# Patient Record
Sex: FEMALE | Race: WHITE | NOT HISPANIC OR LATINO | Employment: FULL TIME | URBAN - METROPOLITAN AREA
[De-identification: names, ages, dates, MRNs, and addresses within clinical notes are randomized per-mention and may not be internally consistent; named-entity substitution may affect disease eponyms.]

---

## 2023-03-03 LAB
ALANINE AMINOTRANSFERASE (SGPT) (U/L) IN SER/PLAS: 18 U/L (ref 7–45)
ALBUMIN (G/DL) IN SER/PLAS: 4.3 G/DL (ref 3.4–5)
ALKALINE PHOSPHATASE (U/L) IN SER/PLAS: 62 U/L (ref 33–110)
ANION GAP IN SER/PLAS: 12 MMOL/L (ref 10–20)
ASPARTATE AMINOTRANSFERASE (SGOT) (U/L) IN SER/PLAS: 16 U/L (ref 9–39)
BILIRUBIN TOTAL (MG/DL) IN SER/PLAS: 0.8 MG/DL (ref 0–1.2)
CALCIUM (MG/DL) IN SER/PLAS: 9.2 MG/DL (ref 8.6–10.6)
CARBON DIOXIDE, TOTAL (MMOL/L) IN SER/PLAS: 28 MMOL/L (ref 21–32)
CHLORIDE (MMOL/L) IN SER/PLAS: 107 MMOL/L (ref 98–107)
CHOLESTEROL (MG/DL) IN SER/PLAS: 154 MG/DL (ref 0–199)
CHOLESTEROL IN HDL (MG/DL) IN SER/PLAS: 52.8 MG/DL
CHOLESTEROL/HDL RATIO: 2.9
CREATININE (MG/DL) IN SER/PLAS: 0.57 MG/DL (ref 0.5–1.05)
ERYTHROCYTE DISTRIBUTION WIDTH (RATIO) BY AUTOMATED COUNT: 12.5 % (ref 11.5–14.5)
ERYTHROCYTE MEAN CORPUSCULAR HEMOGLOBIN CONCENTRATION (G/DL) BY AUTOMATED: 32.4 G/DL (ref 32–36)
ERYTHROCYTE MEAN CORPUSCULAR VOLUME (FL) BY AUTOMATED COUNT: 92 FL (ref 80–100)
ERYTHROCYTES (10*6/UL) IN BLOOD BY AUTOMATED COUNT: 4.86 X10E12/L (ref 4–5.2)
GFR FEMALE: >90 ML/MIN/1.73M2
GLUCOSE (MG/DL) IN SER/PLAS: 83 MG/DL (ref 74–99)
HEMATOCRIT (%) IN BLOOD BY AUTOMATED COUNT: 44.8 % (ref 36–46)
HEMOGLOBIN (G/DL) IN BLOOD: 14.5 G/DL (ref 12–16)
LDL: 91 MG/DL (ref 0–99)
LEUKOCYTES (10*3/UL) IN BLOOD BY AUTOMATED COUNT: 6.5 X10E9/L (ref 4.4–11.3)
NRBC (PER 100 WBCS) BY AUTOMATED COUNT: 0 /100 WBC (ref 0–0)
PLATELETS (10*3/UL) IN BLOOD AUTOMATED COUNT: 320 X10E9/L (ref 150–450)
POTASSIUM (MMOL/L) IN SER/PLAS: 4 MMOL/L (ref 3.5–5.3)
PROTEIN TOTAL: 6.3 G/DL (ref 6.4–8.2)
SODIUM (MMOL/L) IN SER/PLAS: 143 MMOL/L (ref 136–145)
THYROTROPIN (MIU/L) IN SER/PLAS BY DETECTION LIMIT <= 0.05 MIU/L: 0.32 MIU/L (ref 0.44–3.98)
THYROXINE (T4) FREE (NG/DL) IN SER/PLAS: 0.96 NG/DL (ref 0.78–1.48)
TRIGLYCERIDE (MG/DL) IN SER/PLAS: 50 MG/DL (ref 0–149)
UREA NITROGEN (MG/DL) IN SER/PLAS: 17 MG/DL (ref 6–23)
VLDL: 10 MG/DL (ref 0–40)

## 2023-04-18 DIAGNOSIS — F41.9 ANXIETY: Primary | ICD-10-CM

## 2023-04-19 RX ORDER — ESCITALOPRAM OXALATE 20 MG/1
TABLET ORAL
Qty: 90 TABLET | Refills: 1 | Status: SHIPPED | OUTPATIENT
Start: 2023-04-19 | End: 2024-01-28

## 2023-09-07 ENCOUNTER — APPOINTMENT (OUTPATIENT)
Dept: PRIMARY CARE | Facility: CLINIC | Age: 43
End: 2023-09-07
Payer: COMMERCIAL

## 2023-09-12 DIAGNOSIS — I10 PRIMARY HYPERTENSION: ICD-10-CM

## 2023-09-15 RX ORDER — HYDROCHLOROTHIAZIDE 25 MG/1
25 TABLET ORAL DAILY
Qty: 90 TABLET | Refills: 1 | Status: SHIPPED | OUTPATIENT
Start: 2023-09-15

## 2023-12-05 DIAGNOSIS — I10 PRIMARY HYPERTENSION: ICD-10-CM

## 2023-12-06 RX ORDER — LOSARTAN POTASSIUM 100 MG/1
100 TABLET ORAL DAILY
Qty: 90 TABLET | Refills: 0 | Status: SHIPPED | OUTPATIENT
Start: 2023-12-06 | End: 2024-04-03

## 2023-12-06 RX ORDER — BUPROPION HYDROCHLORIDE 300 MG/1
300 TABLET ORAL DAILY
Qty: 90 TABLET | Refills: 0 | Status: SHIPPED | OUTPATIENT
Start: 2023-12-06 | End: 2024-02-23 | Stop reason: SDUPTHER

## 2024-01-08 ENCOUNTER — TELEPHONE (OUTPATIENT)
Dept: OBSTETRICS AND GYNECOLOGY | Facility: CLINIC | Age: 44
End: 2024-01-08
Payer: COMMERCIAL

## 2024-01-08 NOTE — TELEPHONE ENCOUNTER
Left message for Lisa Cabezas to give the office a call back to r/s her appointment for 2/22/24.     VENITA CUMMINGS MA

## 2024-01-24 ENCOUNTER — OFFICE VISIT (OUTPATIENT)
Dept: PRIMARY CARE | Facility: CLINIC | Age: 44
End: 2024-01-24
Payer: COMMERCIAL

## 2024-01-24 ENCOUNTER — APPOINTMENT (OUTPATIENT)
Dept: RADIOLOGY | Facility: CLINIC | Age: 44
End: 2024-01-24
Payer: COMMERCIAL

## 2024-01-24 VITALS
BODY MASS INDEX: 34.9 KG/M2 | DIASTOLIC BLOOD PRESSURE: 88 MMHG | WEIGHT: 197 LBS | SYSTOLIC BLOOD PRESSURE: 148 MMHG | HEART RATE: 72 BPM

## 2024-01-24 DIAGNOSIS — F32.A DEPRESSIVE DISORDER: ICD-10-CM

## 2024-01-24 DIAGNOSIS — S60.519S: ICD-10-CM

## 2024-01-24 DIAGNOSIS — I10 HYPERTENSION, UNSPECIFIED TYPE: ICD-10-CM

## 2024-01-24 DIAGNOSIS — J45.20 MILD INTERMITTENT ASTHMA, UNSPECIFIED WHETHER COMPLICATED (HHS-HCC): Primary | ICD-10-CM

## 2024-01-24 PROCEDURE — 99214 OFFICE O/P EST MOD 30 MIN: CPT | Performed by: INTERNAL MEDICINE

## 2024-01-24 PROCEDURE — 3079F DIAST BP 80-89 MM HG: CPT | Performed by: INTERNAL MEDICINE

## 2024-01-24 PROCEDURE — 3077F SYST BP >= 140 MM HG: CPT | Performed by: INTERNAL MEDICINE

## 2024-01-24 PROCEDURE — 1036F TOBACCO NON-USER: CPT | Performed by: INTERNAL MEDICINE

## 2024-01-24 RX ORDER — CLONIDINE 0.2 MG/24H
PATCH, EXTENDED RELEASE TRANSDERMAL
Qty: 4 PATCH | Refills: 0 | Status: SHIPPED | OUTPATIENT
Start: 2024-01-24 | End: 2024-02-23 | Stop reason: WASHOUT

## 2024-01-24 NOTE — PROGRESS NOTES
Subjective   Patient ID: Lisa Cabezas is a 43 y.o. female who presents for Follow-up.    HPI     Patient is a 43-year-old female with past medical history of anxiety and hypertension who presents for follow-up.  Has not been seen since March 2023.  Her blood pressure today is not well-controlled.  She does report significant anxiety related to being a single parent.  She states that her child's father is in the picture and is very supportive.  Nevertheless the stresses are quite difficult.  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  She is taking her medications as prescribed.  He has gained 7 pounds since last year.    She states that while her anxiety goes up and down she feels like it is as controlled as it can be.  She feels like her anxiety is due to the stress of work and the stresses of being a single mom.  She is happy with the medication as it is    She is concerned about arthritis of the hands.  It has been progressive over the last year.  Affecting both hands equally.  No swelling of the joints        Review of Systems  Constitutional: No fever or chills  Cardiovascular: no chest pain, no palpitations and no syncope.   Respiratory: no cough, no shortness of breath during exertion and no shortness of breath at rest.   Gastrointestinal: no abdominal pain, no nausea and no vomiting.  Neuro: No Headache, no dizziness    Objective   /88   Pulse 72   Wt 89.4 kg (197 lb)   BMI 34.90 kg/m²     Physical Exam  Constitutional: Alert and in no acute distress. Well developed, well nourished  Head and Face: Head and face: Normal.    Cardiovascular: Heart rate and rhythm were normal, normal S1 and S2. No peripheral edema.   Pulmonary: No respiratory distress. Clear bilateral breath sounds.  Musculoskeletal: Gait and station: Normal. Muscle strength/tone: Normal.   Skin: Normal skin color and pigmentation, normal skin turgor,  and no rash.    Psychiatric: Judgment and insight: Intact. Mood and affect: Normal.        Lab Results   Component Value Date    WBC 6.5 03/03/2023    HGB 14.5 03/03/2023    HCT 44.8 03/03/2023     03/03/2023    CHOL 154 03/03/2023    TRIG 50 03/03/2023    HDL 52.8 03/03/2023    ALT 18 03/03/2023    AST 16 03/03/2023     03/03/2023    K 4.0 03/03/2023     03/03/2023    CREATININE 0.57 03/03/2023    BUN 17 03/03/2023    CO2 28 03/03/2023    TSH 0.32 (L) 03/03/2023       US limited breast  Narrative: Interpreted By:  HAKAN HEREDIA MD  MRN: [id number]  Patient Name: NATHANIEL PICKENS     STUDY:  BREAST ULTRASOUND; DIGITAL DIAG MAMM LEFT W/KARINE UNILAT;  4/10/2023  2:31 pm; 4/10/2023 1:51 pm     ACCESSION NUMBER(S):  [id number]; [id number]     ORDERING CLINICIAN:  TERRELL VORA     INDICATION:  Recall from screening exam for left breast asymmetry.     COMPARISON:  03/07/2023, 11/03/2021, 11/04/2020     FINDINGS:  MAMMOGRAPHY: 2D and tomosynthesis images were reviewed at 1 mm slice  thickness.     The breast tissue is heterogeneously dense, which may obscure small  masses.  Asymmetry in the superior left breast persists on additional  spot compression view but contains interspersed fat and may represent  focal fibroglandular tissue.     ULTRASOUND: Targeted ultrasound was performed of the superior left  breast by a registered sonographer with elastography. In the superior  left breast at 12:30, 9 cm from the nipple, there is a focal area of  fibroglandular tissue which corresponds to the area seen  mammographically. No other focal sonographic abnormalities visualized  in the superior left breast. The tissue is soft on elastography.     Impression: Previously seen left breast asymmetry corresponds to benign focal  dense fibroglandular tissue. No mammographic or targeted sonographic  evidence of malignancy in the left breast. Patient may resume annual  screening.     BI-RADS CATEGORY:     Category: 1 -  Negative.  Recommendation: 1 Year Screening.     For any future breast imaging appointments, please call [phone]  ([phone]).     Patient letter sent SNORM     BI digital DIAG mamm  Narrative: Interpreted By:  HAKAN HEREDIA MD  MRN: [id number]  Patient Name: NATHANIEL PICKENS     STUDY:  BREAST ULTRASOUND; DIGITAL DIAG MAMM LEFT W/KARINE UNILAT;  4/10/2023  2:31 pm; 4/10/2023 1:51 pm     ACCESSION NUMBER(S):  [id number]; [id number]     ORDERING CLINICIAN:  TERRELL VORA     INDICATION:  Recall from screening exam for left breast asymmetry.     COMPARISON:  03/07/2023, 11/03/2021, 11/04/2020     FINDINGS:  MAMMOGRAPHY: 2D and tomosynthesis images were reviewed at 1 mm slice  thickness.     The breast tissue is heterogeneously dense, which may obscure small  masses.  Asymmetry in the superior left breast persists on additional  spot compression view but contains interspersed fat and may represent  focal fibroglandular tissue.     ULTRASOUND: Targeted ultrasound was performed of the superior left  breast by a registered sonographer with elastography. In the superior  left breast at 12:30, 9 cm from the nipple, there is a focal area of  fibroglandular tissue which corresponds to the area seen  mammographically. No other focal sonographic abnormalities visualized  in the superior left breast. The tissue is soft on elastography.     Impression: Previously seen left breast asymmetry corresponds to benign focal  dense fibroglandular tissue. No mammographic or targeted sonographic  evidence of malignancy in the left breast. Patient may resume annual  screening.     BI-RADS CATEGORY:     Category: 1 - Negative.  Recommendation: 1 Year Screening.     For any future breast imaging appointments, please call [phone]  [phone]).     Patient letter sent SNORM               Assessment/Plan   Problem List Items Addressed This Visit             ICD-10-CM    Mild intermittent asthma, unspecified whether complicated - Primary J45.20     Stable  with no recent flares         Depressive disorder F32.A     Continue bupropion and escitalopram as prescribed.         Hypertension I10     Uncontrolled.  Can considering anxiety will start clonidine patch to help bring down the blood pressure.  Return to clinic 1 month for reevaluation.  Can consider changing medications if it is not helpful.         Relevant Medications    cloNIDine (Catapres-TTS-2) 0.2 mg/24 hr patch    Other Relevant Orders    Follow Up [facility] - PCP - Established     Other Visit Diagnoses         Codes    Abrasion of hand, unspecified laterality, sequela     S60.519S    Relevant Orders    XR hand left 3+ views

## 2024-01-24 NOTE — ASSESSMENT & PLAN NOTE
Uncontrolled.  Can considering anxiety will start clonidine patch to help bring down the blood pressure.  Return to clinic 1 month for reevaluation.  Can consider changing medications if it is not helpful.

## 2024-01-27 DIAGNOSIS — F41.9 ANXIETY: ICD-10-CM

## 2024-01-28 RX ORDER — ESCITALOPRAM OXALATE 20 MG/1
20 TABLET ORAL DAILY
Qty: 90 TABLET | Refills: 0 | Status: SHIPPED | OUTPATIENT
Start: 2024-01-28 | End: 2024-05-20

## 2024-02-02 ENCOUNTER — HOSPITAL ENCOUNTER (OUTPATIENT)
Dept: RADIOLOGY | Facility: CLINIC | Age: 44
Discharge: HOME | End: 2024-02-02
Payer: COMMERCIAL

## 2024-02-02 DIAGNOSIS — S60.519S: ICD-10-CM

## 2024-02-02 PROCEDURE — 73130 X-RAY EXAM OF HAND: CPT | Mod: LT

## 2024-02-02 PROCEDURE — 73130 X-RAY EXAM OF HAND: CPT | Mod: LEFT SIDE | Performed by: RADIOLOGY

## 2024-02-19 ENCOUNTER — APPOINTMENT (OUTPATIENT)
Dept: PRIMARY CARE | Facility: CLINIC | Age: 44
End: 2024-02-19
Payer: COMMERCIAL

## 2024-02-23 ENCOUNTER — OFFICE VISIT (OUTPATIENT)
Dept: PRIMARY CARE | Facility: CLINIC | Age: 44
End: 2024-02-23
Payer: COMMERCIAL

## 2024-02-23 VITALS
WEIGHT: 195 LBS | BODY MASS INDEX: 34.54 KG/M2 | DIASTOLIC BLOOD PRESSURE: 84 MMHG | OXYGEN SATURATION: 99 % | SYSTOLIC BLOOD PRESSURE: 141 MMHG | HEART RATE: 74 BPM

## 2024-02-23 DIAGNOSIS — F32.A DEPRESSIVE DISORDER: Primary | ICD-10-CM

## 2024-02-23 DIAGNOSIS — I10 HYPERTENSION, UNSPECIFIED TYPE: ICD-10-CM

## 2024-02-23 DIAGNOSIS — I10 PRIMARY HYPERTENSION: ICD-10-CM

## 2024-02-23 PROCEDURE — 99213 OFFICE O/P EST LOW 20 MIN: CPT | Performed by: INTERNAL MEDICINE

## 2024-02-23 PROCEDURE — 1036F TOBACCO NON-USER: CPT | Performed by: INTERNAL MEDICINE

## 2024-02-23 PROCEDURE — 3077F SYST BP >= 140 MM HG: CPT | Performed by: INTERNAL MEDICINE

## 2024-02-23 PROCEDURE — 3079F DIAST BP 80-89 MM HG: CPT | Performed by: INTERNAL MEDICINE

## 2024-02-23 RX ORDER — BUPROPION HYDROCHLORIDE 450 MG/1
450 TABLET, FILM COATED, EXTENDED RELEASE ORAL DAILY
Qty: 90 TABLET | Refills: 1 | Status: SHIPPED | OUTPATIENT
Start: 2024-02-23

## 2024-02-23 RX ORDER — CLONIDINE 0.1 MG/24H
PATCH, EXTENDED RELEASE TRANSDERMAL
Qty: 4 PATCH | Refills: 2 | Status: SHIPPED | OUTPATIENT
Start: 2024-02-23 | End: 2024-06-11 | Stop reason: SDUPTHER

## 2024-02-23 NOTE — PROGRESS NOTES
Subjective   Patient ID: Lisa Cabezas is a 43 y.o. female who presents for No chief complaint on file..    HPI     Patient is a 43-year-old female with past medical history of anxiety and hypertension who presents for follow-up.  HShe does report significant anxiety related to being a single parent.  She states that her child's father is in the picture and is very supportive.  Nevertheless the stresses are quite difficult.  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  She is taking her medications as prescribed.  Last visit clonidine was added at 0.2 formulation of the patch.  She states that she feels much better on the medication however it causes constipation.  Blood pressure is improved but not at goal    She states that while her anxiety goes up and down she feels like it is as controlled as it can be.  She feels like her anxiety is due to the stress of work and the stresses of being a single mom.              Review of Systems  Constitutional: No fever or chills  Cardiovascular: no chest pain, no palpitations and no syncope.   Respiratory: no cough, no shortness of breath during exertion and no shortness of breath at rest.   Gastrointestinal: no abdominal pain, no nausea and no vomiting.  Neuro: No Headache, no dizziness    Objective   /84   Pulse 74   Wt 88.5 kg (195 lb)   SpO2 99%   BMI 34.54 kg/m²     Physical Exam  Constitutional: Alert and in no acute distress. Well developed, well nourished  Head and Face: Head and face: Normal.    Cardiovascular: Heart rate and rhythm were normal, normal S1 and S2. No peripheral edema.   Pulmonary: No respiratory distress. Clear bilateral breath sounds.  Musculoskeletal: Gait and station: Normal. Muscle strength/tone: Normal.   Skin: Normal skin color and pigmentation, normal skin turgor, and no rash.    Psychiatric: Judgment and insight: Intact. Mood and affect:  Normal.        Lab Results   Component Value Date    WBC 6.5 03/03/2023    HGB 14.5 03/03/2023    HCT 44.8 03/03/2023     03/03/2023    CHOL 154 03/03/2023    TRIG 50 03/03/2023    HDL 52.8 03/03/2023    ALT 18 03/03/2023    AST 16 03/03/2023     03/03/2023    K 4.0 03/03/2023     03/03/2023    CREATININE 0.57 03/03/2023    BUN 17 03/03/2023    CO2 28 03/03/2023    TSH 0.32 (L) 03/03/2023       XR hand left 3+ views  Narrative: Interpreted By:  Jv Elizabeth,   STUDY:  XR HAND LEFT 3+ VIEWS      INDICATION:  Signs/Symptoms:hand pain, OA.      COMPARISON:  None      ACCESSION NUMBER(S):  [id number]      ORDERING CLINICIAN:  MALIKA OLGUIN      FINDINGS:  Mild 1st carpometacarpal osteoarthritis. No evidence of fracture. No  erosive changes.      Impression: Mild 1st carpometacarpal osteoarthritis.      Signed by: Jv Elizabeth 2/3/2024 8:11 AM  Dictation workstation:   [id number]            Assessment/Plan   Problem List Items Addressed This Visit             ICD-10-CM    Depressive disorder - Primary F32.A     Increase bupropion and continue this escitalopram.  Follow-up 3 months         Hypertension I10     Better controlled but not at goal.  She states her blood pressure at home is significantly improved between 120 and 130 systolic.  Will decrease the clonidine due to constipation and increase the Wellbutrin.  Follow-up 3 months         Relevant Medications    cloNIDine (Catapres-TTS-1) 0.1 mg/24 hr patch    buPROPion XL (Forfivo XL) 450 mg 24 hr tablet    Other Relevant Orders    Follow Up [facility] - PCP - Established

## 2024-02-23 NOTE — ASSESSMENT & PLAN NOTE
Better controlled but not at goal.  She states her blood pressure at home is significantly improved between 120 and 130 systolic.  Will decrease the clonidine due to constipation and increase the Wellbutrin.  Follow-up 3 months

## 2024-02-24 DIAGNOSIS — F32.A DEPRESSIVE DISORDER: ICD-10-CM

## 2024-02-25 RX ORDER — BUPROPION HYDROCHLORIDE 300 MG/1
300 TABLET ORAL DAILY
Qty: 90 TABLET | Refills: 1 | Status: SHIPPED | OUTPATIENT
Start: 2024-02-25 | End: 2024-06-11 | Stop reason: WASHOUT

## 2024-02-25 RX ORDER — BUPROPION HYDROCHLORIDE 150 MG/1
150 TABLET ORAL DAILY
Qty: 90 TABLET | Refills: 1 | Status: SHIPPED | OUTPATIENT
Start: 2024-02-25 | End: 2024-06-11 | Stop reason: WASHOUT

## 2024-02-26 ENCOUNTER — OFFICE VISIT (OUTPATIENT)
Dept: OBSTETRICS AND GYNECOLOGY | Facility: CLINIC | Age: 44
End: 2024-02-26
Payer: COMMERCIAL

## 2024-02-26 VITALS
HEIGHT: 63 IN | SYSTOLIC BLOOD PRESSURE: 144 MMHG | BODY MASS INDEX: 34.55 KG/M2 | WEIGHT: 195 LBS | DIASTOLIC BLOOD PRESSURE: 70 MMHG

## 2024-02-26 DIAGNOSIS — B96.89 BACTERIAL VAGINOSIS: ICD-10-CM

## 2024-02-26 DIAGNOSIS — Z01.419 WELL WOMAN EXAM WITH ROUTINE GYNECOLOGICAL EXAM: Primary | ICD-10-CM

## 2024-02-26 DIAGNOSIS — N76.0 BACTERIAL VAGINOSIS: ICD-10-CM

## 2024-02-26 DIAGNOSIS — Z12.31 VISIT FOR SCREENING MAMMOGRAM: ICD-10-CM

## 2024-02-26 DIAGNOSIS — Z30.431 SURVEILLANCE FOR BIRTH CONTROL, INTRAUTERINE DEVICE: ICD-10-CM

## 2024-02-26 PROCEDURE — 99396 PREV VISIT EST AGE 40-64: CPT | Performed by: OBSTETRICS & GYNECOLOGY

## 2024-02-26 PROCEDURE — 3078F DIAST BP <80 MM HG: CPT | Performed by: OBSTETRICS & GYNECOLOGY

## 2024-02-26 PROCEDURE — 1036F TOBACCO NON-USER: CPT | Performed by: OBSTETRICS & GYNECOLOGY

## 2024-02-26 PROCEDURE — 3077F SYST BP >= 140 MM HG: CPT | Performed by: OBSTETRICS & GYNECOLOGY

## 2024-02-26 PROCEDURE — 88175 CYTOPATH C/V AUTO FLUID REDO: CPT

## 2024-02-26 PROCEDURE — 87624 HPV HI-RISK TYP POOLED RSLT: CPT

## 2024-02-26 NOTE — PROGRESS NOTES
"Lisa Cabezas is a 43 y.o. female who is here for a routine exam.     Periods are regular every 28-30 days, lasting 8 days.   Has an IUD and is very light and spotty.  Does take a break in between a few days      Sexually active: Mirena IUD in place  Mirena is good through 2029    Regular self breast exam: yes  no concerns on her exams    Menstrual History:  OB History          1    Para   1    Term                AB        Living   1         SAB        IAB        Ectopic        Multiple        Live Births                    Patient's last menstrual period was 2024.         Review of Systems  All Normal Review of Systems  Constitutional: no fever, no chills, no recent weight gain, no recent weight loss and no fatigue.    Gastrointestinal: no abdominal pain, no constipation, no nausea, no diarrhea and no vomiting.    Genitourinary: no dysuria, no urinary incontinence, no vaginal dryness, no vaginal itching, no dyspareunia, no pelvic pain, no dysmenorrhea, no sexual problems, no change in urinary frequency, no vaginal discharge, no unexplained vaginal bleeding and no lesion/sore.    Breasts: No masses.  No nipple discharge.  No redness    Objective   /70   Ht 1.6 m (5' 3\")   Wt 88.5 kg (195 lb)   LMP 2024   BMI 34.54 kg/m²     OBGyn Exam   Physical Exam  Constitutional: Alert and in no acute distress. Well developed, well nourished.   Head and Face: Head and face: Normal.    Eyes: Normal external exam - nonicteric sclera, extraocular movements intact (EOMI) and no ptosis.   Ears, Nose, Mouth, and Throat: External inspection of ears and nose: Normal.    Neck: No neck asymmetry. Supple. Thyroid not enlarged and there were no palpable thyroid nodules.   Chest: Breasts: Normal appearance, no nipple discharge and no skin changes. Palpation of breasts and axillae: No palpable mass and no axillary lymphadenopathy.   Abdomen: Soft nontender; no abdominal mass palpated. No " organomegaly. No hernias.     Genitourinary:   External genitalia: Normal. No inguinal lymphadenopathy. Bartholin's Urethral and Skenes Glands: Normal. Urethra: Normal.    Bladder: Normal on palpation.   Vagina: Normal. No discharge  Cervix: Normal.  IUD strings visualized and palpated  Uterus: Normal.    Right Adnexa/parametria: Normal.  Left Adnexa/parametria: Normal.    Inspection of Perianal Area: Normal.     Musculoskeletal: No joint swelling seen, normal movements of all extremities.   Skin: Normal skin color and pigmentation, normal skin turgor, and no rash.   Neurologic: Non-focal. Grossly intact.   Psychiatric: Alert and oriented x 3. Affect normal to patient baseline. Mood: Appropriate.      Assessment/Plan   1) Annual exam:  Pap with HPV testing completed today  Mammogram order placed  Mirena IUD in place and good through 12/2029    Thank you again for your visit to our office today  Please follow-up as needed or in 1 year with your annual exam

## 2024-03-08 RX ORDER — METRONIDAZOLE 7.5 MG/G
GEL VAGINAL DAILY
Qty: 70 G | Refills: 0 | Status: SHIPPED | OUTPATIENT
Start: 2024-03-08 | End: 2024-03-13

## 2024-03-19 ENCOUNTER — HOSPITAL ENCOUNTER (OUTPATIENT)
Dept: RADIOLOGY | Facility: CLINIC | Age: 44
Discharge: HOME | End: 2024-03-19
Payer: COMMERCIAL

## 2024-03-19 VITALS — WEIGHT: 195.11 LBS | BODY MASS INDEX: 34.57 KG/M2 | HEIGHT: 63 IN

## 2024-03-19 DIAGNOSIS — Z12.31 VISIT FOR SCREENING MAMMOGRAM: ICD-10-CM

## 2024-03-19 PROCEDURE — 77067 SCR MAMMO BI INCL CAD: CPT | Performed by: RADIOLOGY

## 2024-03-19 PROCEDURE — 77067 SCR MAMMO BI INCL CAD: CPT

## 2024-03-19 PROCEDURE — 77063 BREAST TOMOSYNTHESIS BI: CPT | Performed by: RADIOLOGY

## 2024-04-01 DIAGNOSIS — I10 PRIMARY HYPERTENSION: ICD-10-CM

## 2024-04-03 RX ORDER — LOSARTAN POTASSIUM 100 MG/1
100 TABLET ORAL DAILY
Qty: 90 TABLET | Refills: 0 | Status: SHIPPED | OUTPATIENT
Start: 2024-04-03

## 2024-05-18 DIAGNOSIS — F41.9 ANXIETY: ICD-10-CM

## 2024-05-20 RX ORDER — ESCITALOPRAM OXALATE 20 MG/1
20 TABLET ORAL DAILY
Qty: 90 TABLET | Refills: 0 | Status: SHIPPED | OUTPATIENT
Start: 2024-05-20

## 2024-05-24 ENCOUNTER — APPOINTMENT (OUTPATIENT)
Dept: PRIMARY CARE | Facility: CLINIC | Age: 44
End: 2024-05-24
Payer: COMMERCIAL

## 2024-06-11 ENCOUNTER — OFFICE VISIT (OUTPATIENT)
Dept: PRIMARY CARE | Facility: CLINIC | Age: 44
End: 2024-06-11
Payer: COMMERCIAL

## 2024-06-11 VITALS
OXYGEN SATURATION: 98 % | DIASTOLIC BLOOD PRESSURE: 85 MMHG | HEART RATE: 76 BPM | BODY MASS INDEX: 34.73 KG/M2 | SYSTOLIC BLOOD PRESSURE: 148 MMHG | WEIGHT: 196 LBS

## 2024-06-11 DIAGNOSIS — I10 HYPERTENSION, UNSPECIFIED TYPE: Primary | ICD-10-CM

## 2024-06-11 DIAGNOSIS — Z00.00 HEALTH CARE MAINTENANCE: ICD-10-CM

## 2024-06-11 DIAGNOSIS — F32.A DEPRESSIVE DISORDER: ICD-10-CM

## 2024-06-11 PROCEDURE — 3077F SYST BP >= 140 MM HG: CPT | Performed by: INTERNAL MEDICINE

## 2024-06-11 PROCEDURE — 1036F TOBACCO NON-USER: CPT | Performed by: INTERNAL MEDICINE

## 2024-06-11 PROCEDURE — 99213 OFFICE O/P EST LOW 20 MIN: CPT | Performed by: INTERNAL MEDICINE

## 2024-06-11 PROCEDURE — 3079F DIAST BP 80-89 MM HG: CPT | Performed by: INTERNAL MEDICINE

## 2024-06-11 RX ORDER — CLONIDINE 0.1 MG/24H
PATCH, EXTENDED RELEASE TRANSDERMAL
Qty: 4 PATCH | Refills: 11 | Status: SHIPPED | OUTPATIENT
Start: 2024-06-11 | End: 2025-06-11

## 2024-06-11 NOTE — ASSESSMENT & PLAN NOTE
Patient feels like her mood is stable.  She is still under significant amount of stress at work.  Medication is helpful in controlling her symptoms the triggers remain.  Patient wishes to remain at her current position.  Will restart the clonidine as it seems to be helpful for her and continue the Wellbutrin as well as.  Follow-up 30 days

## 2024-06-11 NOTE — ASSESSMENT & PLAN NOTE
Uncontrolled at this time.  Patient states her blood pressure has been better controlled while on clonidine.  Will restart clonidine and advised patient to follow-up in 30 days for reevaluation.  Please check ambulatory blood pressure and call if your systolic blood pressures greater than 130 on a regular basis.  Advised low-salt diet and weight reduction

## 2024-06-11 NOTE — PROGRESS NOTES
Subjective   Patient ID: Lisa Cabezas is a 43 y.o. female who presents for Follow-up.    HPI     Patient is a 43-year-old female with past medical history of anxiety and hypertension who presents for follow-up.     Last visit she wanted to come down on the clonidine due to side effects related to the medication.  she stopped medication 1 week when she ran out of the medication.  States that her mood has deteriorated since stopping the clonidine and currently her blood pressure is elevated at 148.  She feels much better on the clonidine.  No changes in vision orthopnea.  She also feels like her mood was better on the clonidine as well.  Since being off the medication she feels sleeping more rest.  She feels like the low-dose clonidine in combination with high-dose Wellbutrin and Lexapro has been able to control her symptoms.  She still has triggers at work including excessive amounts of work: Continues to add more to her workload.          Review of Systems  Constitutional: No fever or chills  Cardiovascular: no chest pain, no palpitations and no syncope.   Respiratory: no cough, no shortness of breath during exertion and no shortness of breath at rest.   Gastrointestinal: no abdominal pain, no nausea and no vomiting.  Neuro: No Headache, no dizziness    Objective   /85   Pulse 76   Wt 88.9 kg (196 lb)   SpO2 98%   BMI 34.73 kg/m²     Physical Exam  Constitutional: Alert and in no acute distress. Well developed, well nourished  Head and Face: Head and face: Normal.    Cardiovascular: Heart rate and rhythm were normal, normal S1 and S2. No peripheral edema.   Pulmonary: No respiratory distress. Clear bilateral breath sounds.  Musculoskeletal: Gait and station: Normal. Muscle strength/tone: Normal.   Skin: Normal skin color and pigmentation, normal skin turgor, and no rash.    Psychiatric: Judgment and insight: Intact. Mood and affect: Normal.        Lab Results   Component Value Date    WBC 6.5 03/03/2023     HGB 14.5 03/03/2023    HCT 44.8 03/03/2023     03/03/2023    CHOL 154 03/03/2023    TRIG 50 03/03/2023    HDL 52.8 03/03/2023    ALT 18 03/03/2023    AST 16 03/03/2023     03/03/2023    K 4.0 03/03/2023     03/03/2023    CREATININE 0.57 03/03/2023    BUN 17 03/03/2023    CO2 28 03/03/2023    TSH 0.32 (L) 03/03/2023       BI mammo bilateral screening tomosynthesis  Narrative: Interpreted By:  Dottie Roblero,   STUDY:  BI MAMMO BILATERAL SCREENING TOMOSYNTHESIS;  3/19/2024 8:53 am      ACCESSION NUMBER(S):  [id number]      ORDERING CLINICIAN:  TERRELL VORA      INDICATION:  Screening.      COMPARISON:  03/07/2023 11/03/2021      FINDINGS:  2D and tomosynthesis images were reviewed at 1 mm slice thickness.      Density:  The breast tissue is heterogeneously dense, which may  obscure small masses.      No suspicious masses or calcifications are identified.      Impression: No mammographic evidence of malignancy.          BI-RADS CATEGORY:  BI-RADS Category:  1 Negative.  Recommendation:  Routine Screening Mammogram in 1 Year.  Recommended Date:  1 Year.  Laterality:  Bilateral.      For any future breast imaging appointments, please call [phone]  ([phone]).          MACRO:  None      Signed by: Dottie Roblero 3/19/2024 3:51 PM  Dictation workstation:   [id number]            Assessment/Plan   Problem List Items Addressed This Visit             ICD-10-CM    Depressive disorder F32.A     Patient feels like her mood is stable.  She is still under significant amount of stress at work.  Medication is helpful in controlling her symptoms the triggers remain.  Patient wishes to remain at her current position.  Will restart the clonidine as it seems to be helpful for her and continue the Wellbutrin as well as.  Follow-up 30 days         Hypertension - Primary I10     Uncontrolled at this time.  Patient states her blood pressure has been better controlled while on clonidine.  Will restart clonidine and  advised patient to follow-up in 30 days for reevaluation.  Please check ambulatory blood pressure and call if your systolic blood pressures greater than 130 on a regular basis.  Advised low-salt diet and weight reduction         Relevant Medications    cloNIDine (Catapres-TTS-1) 0.1 mg/24 hr patch    Other Relevant Orders    Follow Up [facility] - PCP - Established     Other Visit Diagnoses         Codes    Health care maintenance     Z00.00    Relevant Orders    CBC    Comprehensive metabolic panel    Lipid Panel    TSH with reflex to Free T4 if abnormal

## 2024-06-15 DIAGNOSIS — I10 PRIMARY HYPERTENSION: ICD-10-CM

## 2024-06-19 RX ORDER — HYDROCHLOROTHIAZIDE 25 MG/1
25 TABLET ORAL DAILY
Qty: 90 TABLET | Refills: 0 | Status: SHIPPED | OUTPATIENT
Start: 2024-06-19

## 2024-07-09 ENCOUNTER — LAB (OUTPATIENT)
Dept: LAB | Facility: LAB | Age: 44
End: 2024-07-09
Payer: COMMERCIAL

## 2024-07-09 DIAGNOSIS — Z00.00 HEALTH CARE MAINTENANCE: ICD-10-CM

## 2024-07-09 LAB
ALBUMIN SERPL BCP-MCNC: 4.2 G/DL (ref 3.4–5)
ALP SERPL-CCNC: 54 U/L (ref 33–110)
ALT SERPL W P-5'-P-CCNC: 16 U/L (ref 7–45)
ANION GAP SERPL CALC-SCNC: 13 MMOL/L (ref 10–20)
AST SERPL W P-5'-P-CCNC: 13 U/L (ref 9–39)
BILIRUB SERPL-MCNC: 1.1 MG/DL (ref 0–1.2)
BUN SERPL-MCNC: 10 MG/DL (ref 6–23)
CALCIUM SERPL-MCNC: 9.1 MG/DL (ref 8.6–10.6)
CHLORIDE SERPL-SCNC: 107 MMOL/L (ref 98–107)
CHOLEST SERPL-MCNC: 132 MG/DL (ref 0–199)
CHOLESTEROL/HDL RATIO: 2.5
CO2 SERPL-SCNC: 25 MMOL/L (ref 21–32)
CREAT SERPL-MCNC: 0.67 MG/DL (ref 0.5–1.05)
EGFRCR SERPLBLD CKD-EPI 2021: >90 ML/MIN/1.73M*2
ERYTHROCYTE [DISTWIDTH] IN BLOOD BY AUTOMATED COUNT: 12.3 % (ref 11.5–14.5)
GLUCOSE SERPL-MCNC: 101 MG/DL (ref 74–99)
HCT VFR BLD AUTO: 40.5 % (ref 36–46)
HDLC SERPL-MCNC: 52.5 MG/DL
HGB BLD-MCNC: 13.7 G/DL (ref 12–16)
LDLC SERPL CALC-MCNC: 61 MG/DL
MCH RBC QN AUTO: 30.4 PG (ref 26–34)
MCHC RBC AUTO-ENTMCNC: 33.8 G/DL (ref 32–36)
MCV RBC AUTO: 90 FL (ref 80–100)
NON HDL CHOLESTEROL: 80 MG/DL (ref 0–149)
NRBC BLD-RTO: 0 /100 WBCS (ref 0–0)
PLATELET # BLD AUTO: 318 X10*3/UL (ref 150–450)
POTASSIUM SERPL-SCNC: 3.7 MMOL/L (ref 3.5–5.3)
PROT SERPL-MCNC: 6.3 G/DL (ref 6.4–8.2)
RBC # BLD AUTO: 4.5 X10*6/UL (ref 4–5.2)
SODIUM SERPL-SCNC: 141 MMOL/L (ref 136–145)
TRIGL SERPL-MCNC: 92 MG/DL (ref 0–149)
TSH SERPL-ACNC: 0.95 MIU/L (ref 0.44–3.98)
VLDL: 18 MG/DL (ref 0–40)
WBC # BLD AUTO: 8.3 X10*3/UL (ref 4.4–11.3)

## 2024-07-09 PROCEDURE — 85027 COMPLETE CBC AUTOMATED: CPT

## 2024-07-09 PROCEDURE — 80061 LIPID PANEL: CPT

## 2024-07-09 PROCEDURE — 84443 ASSAY THYROID STIM HORMONE: CPT

## 2024-07-09 PROCEDURE — 36415 COLL VENOUS BLD VENIPUNCTURE: CPT

## 2024-07-09 PROCEDURE — 80053 COMPREHEN METABOLIC PANEL: CPT

## 2024-07-16 ENCOUNTER — APPOINTMENT (OUTPATIENT)
Dept: PRIMARY CARE | Facility: CLINIC | Age: 44
End: 2024-07-16
Payer: COMMERCIAL

## 2024-07-16 VITALS
WEIGHT: 193 LBS | DIASTOLIC BLOOD PRESSURE: 90 MMHG | BODY MASS INDEX: 34.2 KG/M2 | OXYGEN SATURATION: 96 % | HEART RATE: 66 BPM | SYSTOLIC BLOOD PRESSURE: 164 MMHG

## 2024-07-16 DIAGNOSIS — R11.0 NAUSEA: Primary | ICD-10-CM

## 2024-07-16 DIAGNOSIS — I10 HYPERTENSION, UNSPECIFIED TYPE: ICD-10-CM

## 2024-07-16 PROCEDURE — 1036F TOBACCO NON-USER: CPT | Performed by: INTERNAL MEDICINE

## 2024-07-16 PROCEDURE — 3077F SYST BP >= 140 MM HG: CPT | Performed by: INTERNAL MEDICINE

## 2024-07-16 PROCEDURE — 3080F DIAST BP >= 90 MM HG: CPT | Performed by: INTERNAL MEDICINE

## 2024-07-16 PROCEDURE — 99213 OFFICE O/P EST LOW 20 MIN: CPT | Performed by: INTERNAL MEDICINE

## 2024-07-16 RX ORDER — ONDANSETRON 4 MG/1
4 TABLET, ORALLY DISINTEGRATING ORAL EVERY 8 HOURS PRN
Qty: 20 TABLET | Refills: 0 | Status: SHIPPED | OUTPATIENT
Start: 2024-07-16 | End: 2024-07-23

## 2024-07-16 RX ORDER — AMLODIPINE BESYLATE 10 MG/1
10 TABLET ORAL DAILY
Qty: 30 TABLET | Refills: 5 | Status: SHIPPED | OUTPATIENT
Start: 2024-07-16 | End: 2025-01-12

## 2024-07-16 NOTE — PROGRESS NOTES
Subjective   Patient ID: Lisa Cabezas is a 43 y.o. female who presents for Follow-up.    HPI     Patient is a 43-year-old female with past medical history of anxiety and hypertension who presents for follow-up.     Blood pressure still elevated.  She did not tolerate the clonidine patch.  Wishes to try an alternate medication.  Blood pressure today uncontrolled.  No headache changes in vision orthopnea          Review of Systems  Constitutional: No fever or chills  Cardiovascular: no chest pain, no palpitations and no syncope.   Respiratory: no cough, no shortness of breath during exertion and no shortness of breath at rest.   Gastrointestinal: no abdominal pain, no nausea and no vomiting.  Neuro: No Headache, no dizziness    Objective   /90   Pulse 66   Wt 87.5 kg (193 lb)   SpO2 96%   BMI 34.20 kg/m²     Physical Exam  Constitutional: Alert and in no acute distress. Well developed, well nourished  Head and Face: Head and face: Normal.    Cardiovascular: Heart rate and rhythm were normal, normal S1 and S2. No peripheral edema.   Pulmonary: No respiratory distress. Clear bilateral breath sounds.  Musculoskeletal: Gait and station: Normal. Muscle strength/tone: Normal.   Skin: Normal skin color and pigmentation, normal skin turgor, and no rash.    Psychiatric: Judgment and insight: Intact. Mood and affect: Normal.        Lab Results   Component Value Date    WBC 8.3 07/09/2024    HGB 13.7 07/09/2024    HCT 40.5 07/09/2024     07/09/2024    CHOL 132 07/09/2024    TRIG 92 07/09/2024    HDL 52.5 07/09/2024    ALT 16 07/09/2024    AST 13 07/09/2024     07/09/2024    K 3.7 07/09/2024     07/09/2024    CREATININE 0.67 07/09/2024    BUN 10 07/09/2024    CO2 25 07/09/2024    TSH 0.95 07/09/2024       BI mammo bilateral screening tomosynthesis  Narrative: Interpreted By:  Dottie Roblero,   STUDY:  BI MAMMO BILATERAL SCREENING TOMOSYNTHESIS;  3/19/2024 8:53 am      ACCESSION  NUMBER(S):  [id number]      ORDERING CLINICIAN:  TERRELL VORA      INDICATION:  Screening.      COMPARISON:  03/07/2023 11/03/2021      FINDINGS:  2D and tomosynthesis images were reviewed at 1 mm slice thickness.      Density:  The breast tissue is heterogeneously dense, which may  obscure small masses.      No suspicious masses or calcifications are identified.      Impression: No mammographic evidence of malignancy.          BI-RADS CATEGORY:  BI-RADS Category:  1 Negative.  Recommendation:  Routine Screening Mammogram in 1 Year.  Recommended Date:  1 Year.  Laterality:  Bilateral.      For any future breast imaging appointments, please call [phone]).          MACRO:  None      Signed by: Dottie Roblero 3/19/2024 3:51 PM  Dictation workstation:   [id number]            Assessment/Plan   Problem List Items Addressed This Visit             ICD-10-CM    Hypertension I10     Uncontrolled.  Stop clonidine.  Start amlodipine.  Follow-up 30 days.  Advised low-salt diet and weight reduction.  If blood pressure still elevated may need to look for secondary causes.         Relevant Medications    amLODIPine (Norvasc) 10 mg tablet    Other Relevant Orders    Follow Up [facility] - PCP - Established     Other Visit Diagnoses         Codes    Nausea    -  Primary R11.0    Relevant Medications    ondansetron ODT (Zofran-ODT) 4 mg disintegrating tablet

## 2024-07-16 NOTE — ASSESSMENT & PLAN NOTE
Uncontrolled.  Stop clonidine.  Start amlodipine.  Follow-up 30 days.  Advised low-salt diet and weight reduction.  If blood pressure still elevated may need to look for secondary causes.

## 2024-08-16 ENCOUNTER — APPOINTMENT (OUTPATIENT)
Dept: PRIMARY CARE | Facility: CLINIC | Age: 44
End: 2024-08-16
Payer: COMMERCIAL

## 2024-08-16 VITALS
BODY MASS INDEX: 34.2 KG/M2 | SYSTOLIC BLOOD PRESSURE: 127 MMHG | HEART RATE: 92 BPM | WEIGHT: 193 LBS | DIASTOLIC BLOOD PRESSURE: 74 MMHG

## 2024-08-16 DIAGNOSIS — I10 HYPERTENSION, UNSPECIFIED TYPE: ICD-10-CM

## 2024-08-16 DIAGNOSIS — F32.A DEPRESSIVE DISORDER: Primary | ICD-10-CM

## 2024-08-16 DIAGNOSIS — K29.00 ACUTE SUPERFICIAL GASTRITIS WITHOUT HEMORRHAGE: ICD-10-CM

## 2024-08-16 PROCEDURE — 3078F DIAST BP <80 MM HG: CPT | Performed by: INTERNAL MEDICINE

## 2024-08-16 PROCEDURE — 1036F TOBACCO NON-USER: CPT | Performed by: INTERNAL MEDICINE

## 2024-08-16 PROCEDURE — 99214 OFFICE O/P EST MOD 30 MIN: CPT | Performed by: INTERNAL MEDICINE

## 2024-08-16 PROCEDURE — 3074F SYST BP LT 130 MM HG: CPT | Performed by: INTERNAL MEDICINE

## 2024-08-16 ASSESSMENT — ENCOUNTER SYMPTOMS: HYPERTENSION: 1

## 2024-08-16 NOTE — ASSESSMENT & PLAN NOTE
Completed course of PPI for 30 days.  Stop at this time.  If symptoms recur within the next 14 days will consider H. pylori testing and then will restart the PPI for 3 months.  If symptoms persist after completion of 3-month course of PPI will refer to gastroenterology for upper endoscopy

## 2024-08-16 NOTE — PROGRESS NOTES
Subjective   Patient ID: Lisa Cabezas is a 43 y.o. female who presents for Follow-up and Hypertension.    Hypertension         Patient is a 43-year-old female with past medical history of anxiety and hypertension who presents for follow-up.     Patient blood pressure better controlled with the addition of the amlodipine.  No headaches change in vision orthopnea.    She does states she is under a lot of stress at work.  She was recently diagnosed with gastritis and started on omeprazole.  She completed 30 days and is wondering what to do next.  She currently does not have any gastric symptoms          Review of Systems  Constitutional: No fever or chills  Cardiovascular: no chest pain, no palpitations and no syncope.   Respiratory: no cough, no shortness of breath during exertion and no shortness of breath at rest.   Gastrointestinal: no abdominal pain, no nausea and no vomiting.  Neuro: No Headache, no dizziness    Objective   /74   Pulse 92   Wt 87.5 kg (193 lb)   BMI 34.20 kg/m²     Physical Exam  Constitutional: Alert and in no acute distress. Well developed, well nourished  Head and Face: Head and face: Normal.    Cardiovascular: Heart rate and rhythm were normal, normal S1 and S2. No peripheral edema.   Pulmonary: No respiratory distress. Clear bilateral breath sounds.  Musculoskeletal: Gait and station: Normal. Muscle strength/tone: Normal.   Skin: Normal skin color and pigmentation, normal skin turgor, and no rash.    Psychiatric: Judgment and insight: Intact. Mood and affect: Normal.        Lab Results   Component Value Date    WBC 8.3 07/09/2024    HGB 13.7 07/09/2024    HCT 40.5 07/09/2024     07/09/2024    CHOL 132 07/09/2024    TRIG 92 07/09/2024    HDL 52.5 07/09/2024    ALT 16 07/09/2024    AST 13 07/09/2024     07/09/2024    K 3.7 07/09/2024     07/09/2024    CREATININE 0.67 07/09/2024    BUN 10 07/09/2024    CO2 25 07/09/2024    TSH 0.95 07/09/2024       BI mammo  bilateral screening tomosynthesis  Narrative: Interpreted By:  Dottie Roblero,   STUDY:  BI MAMMO BILATERAL SCREENING TOMOSYNTHESIS;  3/19/2024 8:53 am      ACCESSION NUMBER(S):  [id number]      ORDERING CLINICIAN:  TERRELL VORA      INDICATION:  Screening.      COMPARISON:  03/07/2023 11/03/2021      FINDINGS:  2D and tomosynthesis images were reviewed at 1 mm slice thickness.      Density:  The breast tissue is heterogeneously dense, which may  obscure small masses.      No suspicious masses or calcifications are identified.      Impression: No mammographic evidence of malignancy.          BI-RADS CATEGORY:  BI-RADS Category:  1 Negative.  Recommendation:  Routine Screening Mammogram in 1 Year.  Recommended Date:  1 Year.  Laterality:  Bilateral.      For any future breast imaging appointments, please call [phone]  [phone]).          MACRO:  None      Signed by: Dottie Roblero 3/19/2024 3:51 PM  Dictation workstation:   [id number]            Assessment/Plan   Problem List Items Addressed This Visit             ICD-10-CM    Depressive disorder - Primary F32.A     Symptoms stable on bupropion and Lexapro.  No changes in medications.  Follow-up 6-month         Hypertension I10     Controlled on current medications.  No medication adjustments.  Follow-up 6 months         Acute superficial gastritis without hemorrhage K29.00     Completed course of PPI for 30 days.  Stop at this time.  If symptoms recur within the next 14 days will consider H. pylori testing and then will restart the PPI for 3 months.  If symptoms persist after completion of 3-month course of PPI will refer to gastroenterology for upper endoscopy

## 2024-09-02 DIAGNOSIS — F41.9 ANXIETY: ICD-10-CM

## 2024-09-03 RX ORDER — ESCITALOPRAM OXALATE 20 MG/1
20 TABLET ORAL DAILY
Qty: 90 TABLET | Refills: 0 | Status: SHIPPED | OUTPATIENT
Start: 2024-09-03

## 2024-10-06 DIAGNOSIS — I10 PRIMARY HYPERTENSION: ICD-10-CM

## 2024-10-07 RX ORDER — HYDROCHLOROTHIAZIDE 25 MG/1
25 TABLET ORAL DAILY
Qty: 90 TABLET | Refills: 2 | Status: SHIPPED | OUTPATIENT
Start: 2024-10-07

## 2024-10-21 DIAGNOSIS — F32.A DEPRESSIVE DISORDER: ICD-10-CM

## 2024-10-21 RX ORDER — BUPROPION HYDROCHLORIDE 300 MG/1
300 TABLET ORAL DAILY
Qty: 90 TABLET | Refills: 1 | Status: SHIPPED | OUTPATIENT
Start: 2024-10-21

## 2024-10-21 RX ORDER — BUPROPION HYDROCHLORIDE 150 MG/1
150 TABLET ORAL DAILY
Qty: 90 TABLET | Refills: 1 | Status: SHIPPED | OUTPATIENT
Start: 2024-10-21

## 2024-11-14 DIAGNOSIS — I10 PRIMARY HYPERTENSION: ICD-10-CM

## 2024-11-15 RX ORDER — LOSARTAN POTASSIUM 100 MG/1
100 TABLET ORAL DAILY
Qty: 90 TABLET | Refills: 0 | Status: SHIPPED | OUTPATIENT
Start: 2024-11-15

## 2024-12-13 ENCOUNTER — OFFICE VISIT (OUTPATIENT)
Dept: PEDIATRICS | Facility: CLINIC | Age: 44
End: 2024-12-13
Payer: COMMERCIAL

## 2024-12-13 DIAGNOSIS — Z23 ENCOUNTER FOR IMMUNIZATION: ICD-10-CM

## 2024-12-13 PROCEDURE — 91320 SARSCV2 VAC 30MCG TRS-SUC IM: CPT | Performed by: PEDIATRICS

## 2024-12-13 PROCEDURE — 90480 ADMN SARSCOV2 VAC 1/ONLY CMP: CPT | Performed by: PEDIATRICS

## 2024-12-13 PROCEDURE — 90656 IIV3 VACC NO PRSV 0.5 ML IM: CPT | Performed by: PEDIATRICS

## 2024-12-13 PROCEDURE — 90471 IMMUNIZATION ADMIN: CPT | Performed by: PEDIATRICS

## 2025-01-08 DIAGNOSIS — F41.9 ANXIETY: ICD-10-CM

## 2025-01-09 RX ORDER — ESCITALOPRAM OXALATE 20 MG/1
20 TABLET ORAL DAILY
Qty: 90 TABLET | Refills: 0 | Status: SHIPPED | OUTPATIENT
Start: 2025-01-09

## 2025-02-05 ENCOUNTER — TELEPHONE (OUTPATIENT)
Dept: OBSTETRICS AND GYNECOLOGY | Facility: CLINIC | Age: 45
End: 2025-02-05
Payer: COMMERCIAL

## 2025-02-05 NOTE — TELEPHONE ENCOUNTER
Called patient left voicemail in regards to rescheduling appointments due to provider being out of the office.    Callie Hensley MA

## 2025-02-18 ENCOUNTER — APPOINTMENT (OUTPATIENT)
Dept: PRIMARY CARE | Facility: CLINIC | Age: 45
End: 2025-02-18
Payer: COMMERCIAL

## 2025-02-18 VITALS
WEIGHT: 191 LBS | SYSTOLIC BLOOD PRESSURE: 114 MMHG | OXYGEN SATURATION: 95 % | BODY MASS INDEX: 33.84 KG/M2 | HEART RATE: 77 BPM | DIASTOLIC BLOOD PRESSURE: 76 MMHG

## 2025-02-18 DIAGNOSIS — I10 HYPERTENSION, UNSPECIFIED TYPE: ICD-10-CM

## 2025-02-18 DIAGNOSIS — J45.20 MILD INTERMITTENT ASTHMA, UNSPECIFIED WHETHER COMPLICATED (HHS-HCC): ICD-10-CM

## 2025-02-18 DIAGNOSIS — N20.0 KIDNEY STONE: Primary | ICD-10-CM

## 2025-02-18 DIAGNOSIS — R74.01 TRANSAMINITIS: ICD-10-CM

## 2025-02-18 PROCEDURE — 99214 OFFICE O/P EST MOD 30 MIN: CPT | Performed by: INTERNAL MEDICINE

## 2025-02-18 PROCEDURE — 3078F DIAST BP <80 MM HG: CPT | Performed by: INTERNAL MEDICINE

## 2025-02-18 PROCEDURE — 1036F TOBACCO NON-USER: CPT | Performed by: INTERNAL MEDICINE

## 2025-02-18 PROCEDURE — 3074F SYST BP LT 130 MM HG: CPT | Performed by: INTERNAL MEDICINE

## 2025-02-18 ASSESSMENT — PATIENT HEALTH QUESTIONNAIRE - PHQ9
SUM OF ALL RESPONSES TO PHQ9 QUESTIONS 1 AND 2: 0
1. LITTLE INTEREST OR PLEASURE IN DOING THINGS: NOT AT ALL
2. FEELING DOWN, DEPRESSED OR HOPELESS: NOT AT ALL

## 2025-02-18 ASSESSMENT — ENCOUNTER SYMPTOMS: HYPERTENSION: 1

## 2025-02-18 NOTE — ASSESSMENT & PLAN NOTE
Controlled on current medications.  No medication adjustment  Could consider adjusting HCTZ depending on stone composition

## 2025-02-18 NOTE — PROGRESS NOTES
Subjective   Patient ID: Lisa Cabezas is a 44 y.o. female who presents for Hospital Follow-up.    Hypertension         Patient is a 44-year-old female with past medical history of anxiety and hypertension who presents for follow-up.     Hypertension.  Controlled on current medications.  No headache changes in vision orthopnea    Recently diagnosed with kidney stones in the emergency room.  Patient was admitted and had a cystoscopy.  Stent in place.  No active pain.  She also had a urinary tract infection status posttreatment.  She is interested in referral to urology to remove the stents and for further evaluation of the kidney stone.        Review of Systems  Constitutional: No fever or chills  Cardiovascular: no chest pain, no palpitations and no syncope.   Respiratory: no cough, no shortness of breath during exertion and no shortness of breath at rest.   Gastrointestinal: no abdominal pain, no nausea and no vomiting.  Neuro: No Headache, no dizziness    Objective   /76   Pulse 77   Wt 86.6 kg (191 lb)   SpO2 95%   BMI 33.84 kg/m²     Physical Exam  Constitutional: Alert and in no acute distress. Well developed, well nourished  Head and Face: Head and face: Normal.    Cardiovascular: Heart rate and rhythm were normal, normal S1 and S2. No peripheral edema.   Pulmonary: No respiratory distress. Clear bilateral breath sounds.  Musculoskeletal: Gait and station: Normal. Muscle strength/tone: Normal.   Skin: Normal skin color and pigmentation, normal skin turgor, and no rash.    Psychiatric: Judgment and insight: Intact. Mood and affect: Normal.        Lab Results   Component Value Date    WBC 8.3 07/09/2024    HGB 13.7 07/09/2024    HCT 40.5 07/09/2024     07/09/2024    CHOL 132 07/09/2024    TRIG 92 07/09/2024    HDL 52.5 07/09/2024    ALT 16 07/09/2024    AST 13 07/09/2024     07/09/2024    K 3.7 07/09/2024     07/09/2024    CREATININE 0.67 07/09/2024    BUN 10 07/09/2024    CO2 25  07/09/2024    TSH 0.95 07/09/2024       BI mammo bilateral screening tomosynthesis  Narrative: Interpreted By:  Dottie Roblero,   STUDY:  BI MAMMO BILATERAL SCREENING TOMOSYNTHESIS;  3/19/2024 8:53 am      ACCESSION NUMBER(S):  [id number]      ORDERING CLINICIAN:  TERRELL VORA      INDICATION:  Screening.      COMPARISON:  03/07/2023 11/03/2021      FINDINGS:  2D and tomosynthesis images were reviewed at 1 mm slice thickness.      Density:  The breast tissue is heterogeneously dense, which may  obscure small masses.      No suspicious masses or calcifications are identified.      Impression: No mammographic evidence of malignancy.          BI-RADS CATEGORY:  BI-RADS Category:  1 Negative.  Recommendation:  Routine Screening Mammogram in 1 Year.  Recommended Date:  1 Year.  Laterality:  Bilateral.      For any future breast imaging appointments, please call [phone]).          MACRO:  None      Signed by: Dottie Roblero 3/19/2024 3:51 PM  Dictation workstation:   [id number]            Assessment/Plan   Problem List Items Addressed This Visit             ICD-10-CM    Mild intermittent asthma, unspecified whether complicated ([facility]) J45.20    Hypertension I10     Controlled on current medications.  No medication adjustment  Could consider adjusting HCTZ depending on stone composition         Kidney stone - Primary N20.0     Referral to urology.  Drink at least 1.5 L of fluid daily         Relevant Orders    Referral to Urology    Hepatic function panel    Potassium     Other Visit Diagnoses         Codes    Transaminitis     R74.01    Relevant Orders    Hepatic function panel

## 2025-02-20 DIAGNOSIS — I10 HYPERTENSION, UNSPECIFIED TYPE: ICD-10-CM

## 2025-02-21 RX ORDER — AMLODIPINE BESYLATE 10 MG/1
10 TABLET ORAL DAILY
Qty: 90 TABLET | Refills: 3 | Status: SHIPPED | OUTPATIENT
Start: 2025-02-21

## 2025-02-27 ENCOUNTER — APPOINTMENT (OUTPATIENT)
Dept: OBSTETRICS AND GYNECOLOGY | Facility: CLINIC | Age: 45
End: 2025-02-27
Payer: COMMERCIAL

## 2025-03-04 ENCOUNTER — APPOINTMENT (OUTPATIENT)
Dept: OBSTETRICS AND GYNECOLOGY | Facility: CLINIC | Age: 45
End: 2025-03-04
Payer: COMMERCIAL

## 2025-03-15 DIAGNOSIS — I10 PRIMARY HYPERTENSION: ICD-10-CM

## 2025-03-16 RX ORDER — LOSARTAN POTASSIUM 100 MG/1
100 TABLET ORAL DAILY
Qty: 90 TABLET | Refills: 3 | Status: SHIPPED | OUTPATIENT
Start: 2025-03-16

## 2025-04-26 DIAGNOSIS — F41.9 ANXIETY: ICD-10-CM

## 2025-04-27 RX ORDER — ESCITALOPRAM OXALATE 20 MG/1
20 TABLET ORAL DAILY
Qty: 90 TABLET | Refills: 1 | Status: SHIPPED | OUTPATIENT
Start: 2025-04-27

## 2025-05-19 ENCOUNTER — TELEPHONE (OUTPATIENT)
Dept: OBSTETRICS AND GYNECOLOGY | Facility: CLINIC | Age: 45
End: 2025-05-19
Payer: COMMERCIAL

## 2025-05-19 NOTE — TELEPHONE ENCOUNTER
Called patient left voicemail in regard to rescheduling appointment on 6/13/25.    Callie Hensley MA     Follow-up with your mental health team.  Return to the ER for worsening symptoms.

## 2025-05-24 DIAGNOSIS — F32.A DEPRESSIVE DISORDER: ICD-10-CM

## 2025-05-26 RX ORDER — BUPROPION HYDROCHLORIDE 300 MG/1
300 TABLET ORAL DAILY
Qty: 90 TABLET | Refills: 0 | Status: SHIPPED | OUTPATIENT
Start: 2025-05-26

## 2025-06-07 DIAGNOSIS — F32.A DEPRESSIVE DISORDER: ICD-10-CM

## 2025-06-09 RX ORDER — BUPROPION HYDROCHLORIDE 150 MG/1
150 TABLET ORAL DAILY
Qty: 90 TABLET | Refills: 0 | Status: SHIPPED | OUTPATIENT
Start: 2025-06-09

## 2025-06-13 ENCOUNTER — APPOINTMENT (OUTPATIENT)
Dept: OBSTETRICS AND GYNECOLOGY | Facility: CLINIC | Age: 45
End: 2025-06-13
Payer: COMMERCIAL

## 2025-06-18 RX ORDER — TAMSULOSIN HYDROCHLORIDE 0.4 MG/1
CAPSULE ORAL
COMMUNITY
Start: 2025-03-09 | End: 2025-06-19 | Stop reason: WASHOUT

## 2025-06-18 SDOH — ECONOMIC STABILITY: FOOD INSECURITY: WITHIN THE PAST 12 MONTHS, YOU WORRIED THAT YOUR FOOD WOULD RUN OUT BEFORE YOU GOT MONEY TO BUY MORE.: NEVER TRUE

## 2025-06-18 SDOH — ECONOMIC STABILITY: TRANSPORTATION INSECURITY
IN THE PAST 12 MONTHS, HAS THE LACK OF TRANSPORTATION KEPT YOU FROM MEDICAL APPOINTMENTS OR FROM GETTING MEDICATIONS?: NO

## 2025-06-18 SDOH — ECONOMIC STABILITY: FOOD INSECURITY: WITHIN THE PAST 12 MONTHS, THE FOOD YOU BOUGHT JUST DIDN'T LAST AND YOU DIDN'T HAVE MONEY TO GET MORE.: NEVER TRUE

## 2025-06-18 SDOH — ECONOMIC STABILITY: INCOME INSECURITY: IN THE LAST 12 MONTHS, WAS THERE A TIME WHEN YOU WERE NOT ABLE TO PAY THE MORTGAGE OR RENT ON TIME?: NO

## 2025-06-18 SDOH — ECONOMIC STABILITY: TRANSPORTATION INSECURITY
IN THE PAST 12 MONTHS, HAS LACK OF TRANSPORTATION KEPT YOU FROM MEETINGS, WORK, OR FROM GETTING THINGS NEEDED FOR DAILY LIVING?: NO

## 2025-06-18 ASSESSMENT — SOCIAL DETERMINANTS OF HEALTH (SDOH)
WITHIN THE LAST YEAR, HAVE TO BEEN RAPED OR FORCED TO HAVE ANY KIND OF SEXUAL ACTIVITY BY YOUR PARTNER OR EX-PARTNER?: NO
HOW HARD IS IT FOR YOU TO PAY FOR THE VERY BASICS LIKE FOOD, HOUSING, MEDICAL CARE, AND HEATING?: NOT HARD AT ALL
WITHIN THE LAST YEAR, HAVE YOU BEEN KICKED, HIT, SLAPPED, OR OTHERWISE PHYSICALLY HURT BY YOUR PARTNER OR EX-PARTNER?: NO
WITHIN THE LAST YEAR, HAVE YOU BEEN AFRAID OF YOUR PARTNER OR EX-PARTNER?: NO
WITHIN THE LAST YEAR, HAVE YOU BEEN HUMILIATED OR EMOTIONALLY ABUSED IN OTHER WAYS BY YOUR PARTNER OR EX-PARTNER?: NO

## 2025-06-19 ENCOUNTER — APPOINTMENT (OUTPATIENT)
Dept: OBSTETRICS AND GYNECOLOGY | Facility: CLINIC | Age: 45
End: 2025-06-19
Payer: COMMERCIAL

## 2025-06-19 VITALS
SYSTOLIC BLOOD PRESSURE: 134 MMHG | DIASTOLIC BLOOD PRESSURE: 72 MMHG | WEIGHT: 198 LBS | HEIGHT: 63 IN | BODY MASS INDEX: 35.08 KG/M2

## 2025-06-19 DIAGNOSIS — Z30.431 SURVEILLANCE FOR BIRTH CONTROL, INTRAUTERINE DEVICE: ICD-10-CM

## 2025-06-19 DIAGNOSIS — Z12.31 VISIT FOR SCREENING MAMMOGRAM: ICD-10-CM

## 2025-06-19 DIAGNOSIS — Z01.419 WELL WOMAN EXAM WITH ROUTINE GYNECOLOGICAL EXAM: Primary | ICD-10-CM

## 2025-06-19 PROCEDURE — 99396 PREV VISIT EST AGE 40-64: CPT | Performed by: OBSTETRICS & GYNECOLOGY

## 2025-06-19 PROCEDURE — 87626 HPV SEP HI-RSK TYP&POOL RSLT: CPT

## 2025-06-19 PROCEDURE — 3075F SYST BP GE 130 - 139MM HG: CPT | Performed by: OBSTETRICS & GYNECOLOGY

## 2025-06-19 PROCEDURE — 1036F TOBACCO NON-USER: CPT | Performed by: OBSTETRICS & GYNECOLOGY

## 2025-06-19 PROCEDURE — 3078F DIAST BP <80 MM HG: CPT | Performed by: OBSTETRICS & GYNECOLOGY

## 2025-06-19 PROCEDURE — 3008F BODY MASS INDEX DOCD: CPT | Performed by: OBSTETRICS & GYNECOLOGY

## 2025-06-19 RX ORDER — LEVONORGESTREL 52 MG/1
1 INTRAUTERINE DEVICE INTRAUTERINE ONCE
COMMUNITY

## 2025-06-19 ASSESSMENT — LIFESTYLE VARIABLES
HOW OFTEN DO YOU HAVE A DRINK CONTAINING ALCOHOL: 2-4 TIMES A MONTH
SKIP TO QUESTIONS 9-10: 1
AUDIT-C TOTAL SCORE: 2
HOW OFTEN DO YOU HAVE SIX OR MORE DRINKS ON ONE OCCASION: NEVER
HOW MANY STANDARD DRINKS CONTAINING ALCOHOL DO YOU HAVE ON A TYPICAL DAY: 1 OR 2

## 2025-06-19 ASSESSMENT — PATIENT HEALTH QUESTIONNAIRE - PHQ9
2. FEELING DOWN, DEPRESSED OR HOPELESS: NOT AT ALL
1. LITTLE INTEREST OR PLEASURE IN DOING THINGS: NOT AT ALL
SUM OF ALL RESPONSES TO PHQ9 QUESTIONS 1 AND 2: 0

## 2025-06-19 ASSESSMENT — ENCOUNTER SYMPTOMS
DEPRESSION: 0
LOSS OF SENSATION IN FEET: 0
OCCASIONAL FEELINGS OF UNSTEADINESS: 0

## 2025-06-19 NOTE — PROGRESS NOTES
"Lisa Cabezas is a 44 y.o. female who is here for a annual exam.     Patient denies any vaginal bleeding with her IUD in place    Sexually active: Mirena IUD in place and good through .  Active no concerns    Regular self breast exam: yes  no concerns on her exams    Menstrual History:  OB History          1    Para   1    Term                AB        Living   1         SAB        IAB        Ectopic        Multiple        Live Births                    Patient's last menstrual period was 2025.         Review of Systems  All Normal Review of Systems  Constitutional: no fever, no chills, no recent weight gain, no recent weight loss and no fatigue.    Gastrointestinal: no abdominal pain, no constipation, no nausea, no diarrhea and no vomiting.    Genitourinary: no dysuria, no urinary incontinence, no vaginal dryness, no vaginal itching, no dyspareunia, no pelvic pain, no dysmenorrhea, no sexual problems, no change in urinary frequency, no vaginal discharge, no unexplained vaginal bleeding and no lesion/sore.    Breasts: No masses.  No nipple discharge.  No redness    Objective   /72   Ht 1.6 m (5' 3\")   Wt 89.8 kg (198 lb)   LMP 2025   BMI 35.07 kg/m²     OBGyn Exam   Physical Exam  Constitutional: Alert and in no acute distress. Well developed, well nourished.   Head and Face: Head and face: Normal.    Eyes: Normal external exam - nonicteric sclera, extraocular movements intact (EOMI) and no ptosis.   Ears, Nose, Mouth, and Throat: External inspection of ears and nose: Normal.    Neck: No neck asymmetry. Supple. Thyroid not enlarged and there were no palpable thyroid nodules.   Chest: Breasts: Normal appearance, no nipple discharge and no skin changes. Palpation of breasts and axillae: No palpable mass and no axillary lymphadenopathy.   Abdomen: Soft nontender; no abdominal mass palpated. No organomegaly. No hernias.     Genitourinary:   External genitalia: Normal. No " inguinal lymphadenopathy. Bartholin's Urethral and Skenes Glands: Normal. Urethra: Normal.    Bladder: Normal on palpation.   Vagina: Normal. No discharge  Cervix: Normal.  IUD strings visualized and palpated  Uterus: Normal.    Right Adnexa/parametria: Normal.  Left Adnexa/parametria: Normal.    Inspection of Perianal Area: Normal.     Musculoskeletal: No joint swelling seen, normal movements of all extremities.   Skin: Normal skin color and pigmentation, normal skin turgor, and no rash.   Neurologic: Non-focal. Grossly intact.   Psychiatric: Alert and oriented x 3. Affect normal to patient baseline. Mood: Appropriate.      Assessment/Plan   1) Annual exam:  Normal exam today  Pap with HPV testing completed  Mammogram order placed  Mirena IUD in place and good through 2029    Thank you again for your visit to our office today  Please follow-up as needed or in 1 year with your annual exam

## 2025-07-02 LAB
CYTOLOGY CMNT CVX/VAG CYTO-IMP: NORMAL
HPV HR 12 DNA GENITAL QL NAA+PROBE: NEGATIVE
HPV HR GENOTYPES PNL CVX NAA+PROBE: NEGATIVE
HPV16 DNA SPEC QL NAA+PROBE: NEGATIVE
HPV18 DNA SPEC QL NAA+PROBE: NEGATIVE
LAB AP HPV GENOTYPE QUESTION: YES
LAB AP HPV HR: NORMAL
LABORATORY COMMENT REPORT: NORMAL
PATH REPORT.TOTAL CANCER: NORMAL

## 2025-08-13 ENCOUNTER — HOSPITAL ENCOUNTER (OUTPATIENT)
Dept: RADIOLOGY | Facility: CLINIC | Age: 45
Discharge: HOME | End: 2025-08-13
Payer: COMMERCIAL

## 2025-08-13 VITALS — HEIGHT: 63 IN | WEIGHT: 198 LBS | BODY MASS INDEX: 35.08 KG/M2

## 2025-08-13 DIAGNOSIS — Z12.31 VISIT FOR SCREENING MAMMOGRAM: ICD-10-CM

## 2025-08-13 PROCEDURE — 77063 BREAST TOMOSYNTHESIS BI: CPT | Performed by: RADIOLOGY

## 2025-08-13 PROCEDURE — 77067 SCR MAMMO BI INCL CAD: CPT | Performed by: RADIOLOGY

## 2025-08-13 PROCEDURE — 77067 SCR MAMMO BI INCL CAD: CPT

## 2025-08-18 ENCOUNTER — APPOINTMENT (OUTPATIENT)
Dept: PRIMARY CARE | Facility: CLINIC | Age: 45
End: 2025-08-18
Payer: COMMERCIAL

## 2025-08-18 VITALS
BODY MASS INDEX: 35.08 KG/M2 | OXYGEN SATURATION: 97 % | WEIGHT: 198 LBS | HEART RATE: 77 BPM | DIASTOLIC BLOOD PRESSURE: 83 MMHG | SYSTOLIC BLOOD PRESSURE: 128 MMHG

## 2025-08-18 DIAGNOSIS — Z12.11 SCREEN FOR COLON CANCER: ICD-10-CM

## 2025-08-18 DIAGNOSIS — N20.0 KIDNEY STONE: ICD-10-CM

## 2025-08-18 DIAGNOSIS — I10 HYPERTENSION, UNSPECIFIED TYPE: ICD-10-CM

## 2025-08-18 DIAGNOSIS — R74.01 TRANSAMINITIS: Primary | ICD-10-CM

## 2025-08-18 DIAGNOSIS — F32.A DEPRESSIVE DISORDER: ICD-10-CM

## 2025-08-18 DIAGNOSIS — Z23 NEED FOR PROPHYLACTIC VACCINATION AGAINST DIPHTHERIA AND TETANUS: ICD-10-CM

## 2025-08-18 PROCEDURE — 3079F DIAST BP 80-89 MM HG: CPT | Performed by: INTERNAL MEDICINE

## 2025-08-18 PROCEDURE — 90471 IMMUNIZATION ADMIN: CPT | Performed by: INTERNAL MEDICINE

## 2025-08-18 PROCEDURE — 90715 TDAP VACCINE 7 YRS/> IM: CPT | Performed by: INTERNAL MEDICINE

## 2025-08-18 PROCEDURE — 99214 OFFICE O/P EST MOD 30 MIN: CPT | Performed by: INTERNAL MEDICINE

## 2025-08-18 PROCEDURE — 3074F SYST BP LT 130 MM HG: CPT | Performed by: INTERNAL MEDICINE

## 2025-08-18 ASSESSMENT — ENCOUNTER SYMPTOMS: HYPERTENSION: 1

## 2026-02-18 ENCOUNTER — APPOINTMENT (OUTPATIENT)
Dept: PRIMARY CARE | Facility: CLINIC | Age: 46
End: 2026-02-18
Payer: COMMERCIAL